# Patient Record
Sex: MALE | Employment: FULL TIME | URBAN - METROPOLITAN AREA
[De-identification: names, ages, dates, MRNs, and addresses within clinical notes are randomized per-mention and may not be internally consistent; named-entity substitution may affect disease eponyms.]

---

## 2021-01-04 ENCOUNTER — APPOINTMENT (OUTPATIENT)
Dept: GENERAL RADIOLOGY | Facility: CLINIC | Age: 21
End: 2021-01-04
Attending: EMERGENCY MEDICINE
Payer: OTHER MISCELLANEOUS

## 2021-01-04 ENCOUNTER — HOSPITAL ENCOUNTER (EMERGENCY)
Facility: CLINIC | Age: 21
Discharge: HOME | End: 2021-01-04
Attending: EMERGENCY MEDICINE | Admitting: EMERGENCY MEDICINE
Payer: OTHER MISCELLANEOUS

## 2021-01-04 VITALS
OXYGEN SATURATION: 99 % | HEART RATE: 86 BPM | TEMPERATURE: 98.1 F | RESPIRATION RATE: 15 BRPM | DIASTOLIC BLOOD PRESSURE: 81 MMHG | SYSTOLIC BLOOD PRESSURE: 126 MMHG

## 2021-01-04 DIAGNOSIS — S93.402A SPRAIN OF LEFT ANKLE, UNSPECIFIED LIGAMENT, INITIAL ENCOUNTER: ICD-10-CM

## 2021-01-04 PROCEDURE — 73610 X-RAY EXAM OF ANKLE: CPT | Mod: LT

## 2021-01-04 PROCEDURE — L4387 NON-PNEUM WALK BOOT PRE OTS: HCPCS | Mod: LT | Performed by: EMERGENCY MEDICINE

## 2021-01-04 PROCEDURE — 29515 APPLICATION SHORT LEG SPLINT: CPT | Mod: LT | Performed by: EMERGENCY MEDICINE

## 2021-01-04 PROCEDURE — 73630 X-RAY EXAM OF FOOT: CPT | Mod: LT

## 2021-01-04 PROCEDURE — 99283 EMERGENCY DEPT VISIT LOW MDM: CPT | Performed by: EMERGENCY MEDICINE

## 2021-01-04 PROCEDURE — 250N000013 HC RX MED GY IP 250 OP 250 PS 637: Performed by: EMERGENCY MEDICINE

## 2021-01-04 PROCEDURE — 99284 EMERGENCY DEPT VISIT MOD MDM: CPT | Mod: 25 | Performed by: EMERGENCY MEDICINE

## 2021-01-04 PROCEDURE — 99281 EMR DPT VST MAYX REQ PHY/QHP: CPT | Mod: 25 | Performed by: EMERGENCY MEDICINE

## 2021-01-04 RX ORDER — IBUPROFEN 200 MG
400 TABLET ORAL EVERY 6 HOURS PRN
Qty: 60 TABLET | Refills: 0 | Status: SHIPPED | OUTPATIENT
Start: 2021-01-04

## 2021-01-04 RX ORDER — IBUPROFEN 200 MG
400 TABLET ORAL EVERY 6 HOURS PRN
Qty: 60 TABLET | Refills: 0 | COMMUNITY
Start: 2021-01-04 | End: 2021-01-04

## 2021-01-04 RX ORDER — ACETAMINOPHEN 500 MG
500-1000 TABLET ORAL EVERY 6 HOURS PRN
Qty: 30 TABLET | Refills: 0 | Status: SHIPPED | OUTPATIENT
Start: 2021-01-04

## 2021-01-04 RX ORDER — ACETAMINOPHEN 500 MG
1000 TABLET ORAL ONCE
Status: COMPLETED | OUTPATIENT
Start: 2021-01-04 | End: 2021-01-04

## 2021-01-04 RX ORDER — IBUPROFEN 600 MG/1
600 TABLET, FILM COATED ORAL ONCE
Status: COMPLETED | OUTPATIENT
Start: 2021-01-04 | End: 2021-01-04

## 2021-01-04 RX ADMIN — IBUPROFEN 600 MG: 600 TABLET, FILM COATED ORAL at 06:33

## 2021-01-04 RX ADMIN — ACETAMINOPHEN 1000 MG: 500 TABLET ORAL at 06:33

## 2021-01-04 NOTE — ED PROVIDER NOTES
ED Provider Note  [facility]      History     Chief Complaint   Patient presents with     Ankle Pain     Patient presents to ED with c/o L ankle injury. Patient reports he twisted his ankle at work today.      HPI  Anibal Redmond is a 20 year old male who had an ankle injury at work where he rolled his left ankle.  This happened just prior to arrival.  He is having difficulty putting weight on it.  He said no injuries to this in the past.  No numbness or tingling.  No bleeding or lacerations.  No knee pain or other pain.  Did not hit his head or lose consciousness.  He is not any medications for pain.    History taken with a professional .    Past Medical History  History reviewed. No pertinent past medical history.  No past surgical history on file.  No current outpatient medications on file.    No Known Allergies  Family History  No family history on file.  Social History   Social History     Tobacco Use     Smoking status: None   Substance Use Topics     Alcohol use: None     Drug use: None      Past medical history, past surgical history, medications, allergies, family history, and social history were reviewed with the patient. No additional pertinent items.       Review of Systems  A complete review of systems was performed with pertinent positives and negatives noted in the HPI, and all other systems negative.    Physical Exam   BP: 137/84  Pulse: 100  Temp: 98.5  F (36.9  C)  Resp: 16  SpO2: 98 %  Physical Exam  Physical Exam   Constitutional: oriented to person, place, and time. appears well-developed and well-nourished.   HENT:   Head: Normocephalic and atraumatic.   Neck: Normal range of motion.   Pulmonary/Chest: Effort normal. No respiratory distress.   Cardiac: No murmurs, rubs, gallops. RRR.  Abdominal: Abdomen soft, nontender, nondistended. No rebound tenderness.  MSK: No obvious deformity to left ankle, there is some ecchymosis over the medial side of the  left foot.  Tender palpation over the bilateral malleoli in addition to over the medial side of the left foot.  Dorsalis pedis and posterior tibial pulses 2+ and symmetric.  Sensation is intact in the lower extremities.  No lacerations.  No tenderness palpation over the proximal tibia or fibula.  Range of motion the knee and he has normal.  Able to range the ankle, slightly limited due to pain. No anterior or side to side laxity.  Neurological: alert and oriented to person, place, and time.   Skin: Skin is warm and dry.   Psychiatric:  normal mood and affect.  behavior is normal. Thought content normal.     ED Course      Procedures  Results for orders placed or performed during the hospital encounter of 01/04/21   XR Ankle Left G/E 3 Views     Status: None    Narrative    XR ANKLE LT G/E 3 VW 1/4/2021 7:04 AM     HISTORY: fall, pain    COMPARISON: None.      Impression    IMPRESSION: No fractures are identified. The ankle mortise is intact.  The talar dome is unremarkable.     CARLOS BRICEÑO MD   Foot XR, G/E 3 views, left     Status: None    Narrative    XR FOOT LT G/E 3 VW 1/4/2021 7:05 AM     HISTORY: fall, pain    COMPARISON: None.      Impression    IMPRESSION: No fractures are identified. Pes planus. Mild hypertrophic  change at the talonavicular joint.     CARLOS BRICEÑO MD          No results found for any visits on 01/04/21.  Medications   acetaminophen (TYLENOL) tablet 1,000 mg (1,000 mg Oral Given 1/4/21 0633)   ibuprofen (ADVIL/MOTRIN) tablet 600 mg (600 mg Oral Given 1/4/21 0633)        Assessments & Plan (with Medical Decision Making)   MDM  Patient presenting with ankle injury, he does have an ankle sprain without fractures.  Neurovascularly intact.  There is no obvious laxity to the joint.  He is not able to ambulate so we will place a walking boot and give crutches.  Recommend following up with orthopedic surgery for proving after 1 week.  Recommended conservative treatment with ice, elevation,  Tylenol ibuprofen.  He will be given a work note as well.  Patient will return if worsening.    I have reviewed the nursing notes. I have reviewed the findings, diagnosis, plan and need for follow up with the patient.    New Prescriptions    ACETAMINOPHEN (TYLENOL) 500 MG TABLET    Take 1-2 tablets (500-1,000 mg) by mouth every 6 hours as needed for mild pain    IBUPROFEN (ADVIL/MOTRIN) 200 MG TABLET    Take 2 tablets (400 mg) by mouth every 6 hours as needed for pain       Final diagnoses:   Sprain of left ankle, unspecified ligament, initial encounter       --  Masood Hurtado  [facility] EMERGENCY DEPARTMENT  1/4/2021     Masood Hurtado MD  01/04/21 0734

## 2021-01-04 NOTE — DISCHARGE INSTRUCTIONS
Please make an appointment to follow up with [facility] (phone: [phone]) in 7 days if not improving.    Use ice and elevate your ankle when you are at home

## 2021-01-04 NOTE — ED AVS SNAPSHOT
[facility]  [address]  [place] [address]  Phone: [phone]  Fax: [phone]                                    Anibal Redmond   MRN: [id number]    Department: [facility]   Date of Visit: 1/4/2021           After Visit Summary Signature Page    I have received my discharge instructions, and my questions have been answered. I have discussed any challenges I see with this plan with the nurse or doctor.    ..........................................................................................................................................  Patient/Patient Representative Signature      ..........................................................................................................................................  Patient Representative Print Name and Relationship to Patient    ..................................................               ................................................  Date                                   Time    ..........................................................................................................................................  Reviewed by Signature/Title    ...................................................              ..............................................  Date                                               Time          22EPIC Rev 08/18

## 2021-01-04 NOTE — LETTER
January 4, 2021      To Whom It May Concern:      Anibal Redmond was seen in our Emergency Department today, 01/04/21.  I expect his condition to improve over the next 1-2 weeks.  He may return to work/school when improved.    Sincerely,        Masood Hurtado MD